# Patient Record
Sex: MALE | Race: WHITE | NOT HISPANIC OR LATINO | Employment: FULL TIME | URBAN - METROPOLITAN AREA
[De-identification: names, ages, dates, MRNs, and addresses within clinical notes are randomized per-mention and may not be internally consistent; named-entity substitution may affect disease eponyms.]

---

## 2024-03-20 ENCOUNTER — OFFICE VISIT (OUTPATIENT)
Dept: NEUROSURGERY | Facility: CLINIC | Age: 59
End: 2024-03-20
Payer: COMMERCIAL

## 2024-03-20 VITALS — HEIGHT: 68 IN | BODY MASS INDEX: 28.43 KG/M2 | WEIGHT: 187.6 LBS | RESPIRATION RATE: 17 BRPM | TEMPERATURE: 98.4 F

## 2024-03-20 DIAGNOSIS — M50.30 DDD (DEGENERATIVE DISC DISEASE), CERVICAL: ICD-10-CM

## 2024-03-20 DIAGNOSIS — M47.22 CERVICAL SPONDYLOSIS WITH RADICULOPATHY: Primary | ICD-10-CM

## 2024-03-20 PROCEDURE — 99203 OFFICE O/P NEW LOW 30 MIN: CPT | Performed by: NEUROLOGICAL SURGERY

## 2024-03-20 RX ORDER — OMEPRAZOLE 40 MG/1
40 CAPSULE, DELAYED RELEASE ORAL DAILY
COMMUNITY

## 2024-03-20 RX ORDER — NEBIVOLOL HYDROCHLORIDE 5 MG/1
5 TABLET ORAL
COMMUNITY

## 2024-03-20 RX ORDER — ATORVASTATIN CALCIUM 20 MG/1
20 TABLET, FILM COATED ORAL DAILY
COMMUNITY

## 2024-03-20 RX ORDER — ZOLPIDEM TARTRATE 5 MG/1
5 TABLET ORAL
COMMUNITY

## 2024-03-20 RX ORDER — CLOPIDOGREL 300 MG/1
300 TABLET, FILM COATED ORAL
COMMUNITY
Start: 2023-11-30

## 2024-03-20 RX ORDER — ASPIRIN 81 MG/1
81 TABLET ORAL
COMMUNITY
Start: 2023-11-26

## 2024-03-20 NOTE — PROGRESS NOTES
Patient: Santi Randhawa  : 1965    Primary Care Provider: Iron Bonilla MD    Requesting Provider: As above        History    Chief Complaint: Neck and left arm pain with weakness.    History of Present Illness: Mr. Randhawa is a 58-year-old bassett who is known to my service.  I previously cared for his son as well.  He has had a several year history of neck difficulty.  Couple of years ago he did some PT with dry needling and the neck pain is low-grade at this point.  However he is having level using his left hand.  Dexterity is diminished in his left hand.  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  He has some ongoing neck pain involving the left shoulder blade.  He has no significant right upper extremity symptoms.    Review of Systems   Constitutional:  Negative for activity change, appetite change, chills, diaphoresis, fatigue, fever and unexpected weight change.   HENT:  Negative for congestion, dental problem, drooling, ear discharge, ear pain, facial swelling, hearing loss, mouth sores, nosebleeds, postnasal drip, rhinorrhea, sinus pressure, sneezing, sore throat, tinnitus, trouble swallowing and voice change.    Eyes:  Negative for photophobia, pain, discharge, redness, itching and visual disturbance.   Respiratory:  Negative for apnea, cough, choking, chest tightness, shortness of breath, wheezing and stridor.    Cardiovascular:  Negative for chest pain, palpitations and leg swelling.   Gastrointestinal:  Negative for abdominal distention, abdominal pain, anal bleeding, blood in stool, constipation, diarrhea, nausea, rectal pain and vomiting.   Endocrine: Negative for cold intolerance, heat intolerance, polydipsia, polyphagia and polyuria.   Genitourinary:  Negative for decreased urine volume, difficulty urinating, dysuria, enuresis, flank pain, frequency, genital sores, hematuria and urgency.   Musculoskeletal:  Positive for neck pain. Negative for arthralgias, back  "pain, gait problem, joint swelling, myalgias and neck stiffness.   Skin:  Negative for color change, pallor, rash and wound.   Allergic/Immunologic: Negative for environmental allergies, food allergies and immunocompromised state.   Neurological:  Negative for dizziness, tremors, seizures, syncope, facial asymmetry, speech difficulty, weakness, light-headedness, numbness and headaches.   Hematological:  Negative for adenopathy. Does not bruise/bleed easily.   Psychiatric/Behavioral:  Negative for agitation, behavioral problems, confusion, decreased concentration, dysphoric mood, hallucinations, self-injury, sleep disturbance and suicidal ideas. The patient is not nervous/anxious and is not hyperactive.    All other systems reviewed and are negative.      The patient's past medical history, past surgical history, family history, and social history have been reviewed at length in the electronic medical record.      Physical Exam:   Temp 98.4 °F (36.9 °C) (Infrared)   Resp 17   Ht 172.7 cm (68\")   Wt 85.1 kg (187 lb 9.6 oz)   BMI 28.52 kg/m²   CONSTITUTIONAL: Patient is well-nourished, pleasant and appears stated age.  MUSCULOSKELETAL:  Neck tenderness to palpation is not observed.   ROM in the neck is normal.  Ranging the left shoulder induces some shoulder pain.  NEUROLOGICAL:  Orientation, memory, attention span, language function, and cognition have been examined and are intact.  Strength is intact in the upper and lower extremities to direct testing.  Muscle tone is normal throughout.  Station and gait are normal.  Sensation is intact to light touch testing throughout.  Deep tendon reflexes are 1+ and symmetrical.  Elías's Sign is negative bilaterally. No clonus is elicited.  Coordination is intact.      Medical Decision Making    Data Review:   (All imaging studies were personally reviewed unless stated otherwise)  MRI of the cervical spine dated 3/7/2024 demonstrates some mild loss of the normal lordosis " with diffuse spondylosis.  There are some broad-based disc-osteophyte at multiple levels.  This is most pronounced on the left at C6-7.  There is no signal change within the cord.    Diagnosis:   1.  Cervical spondylosis with radiculopathy.  2.  Left shoulder dysfunction.    Treatment Options:   I am going to refer the patient for electrodiagnostic studies of the left upper extremity.  Further recommendations will then ensue.      Scribed for Chetan Martínez MD by Cherry Roth CMA on 3/20/2024 13:55 EDT       I, Dr. Martínez, personally performed the services described in the documentation, as scribed in my presence, and it is both accurate and complete.

## 2024-04-10 ENCOUNTER — OFFICE VISIT (OUTPATIENT)
Dept: NEUROSURGERY | Facility: CLINIC | Age: 59
End: 2024-04-10
Payer: COMMERCIAL

## 2024-04-10 VITALS — WEIGHT: 187.2 LBS | TEMPERATURE: 97.3 F | BODY MASS INDEX: 28.37 KG/M2 | HEIGHT: 68 IN

## 2024-04-10 DIAGNOSIS — M50.30 DDD (DEGENERATIVE DISC DISEASE), CERVICAL: ICD-10-CM

## 2024-04-10 DIAGNOSIS — M47.22 CERVICAL SPONDYLOSIS WITH RADICULOPATHY: Primary | ICD-10-CM

## 2024-04-10 PROCEDURE — 99213 OFFICE O/P EST LOW 20 MIN: CPT | Performed by: NEUROLOGICAL SURGERY

## 2024-04-10 NOTE — PROGRESS NOTES
"Patient: Santi Randhawa  : 1965    Primary Care Provider: Iron Bonilla MD    Requesting Provider: As above        History    Chief Complaint: Neck and left arm pain with weakness.    History of Present Illness: Mr. Randhawa is a 58-year-old bassett who is known to my service. I previously cared for his son as well. He has had a several year history of neck difficulty. Couple of years ago he did some PT with dry needling and the neck pain is low-grade at this point. However he is having level using his left hand. Dexterity is diminished in his left hand. 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 He has some ongoing neck pain involving the left shoulder blade.  On occasion he will have a sharp pain extending down the left arm.  He describes shoulder shoulder discomfort bilaterally as well.  He has no significant right upper extremity symptoms.  He sees a massage therapist and physical therapist and that is very helpful for at least a short period of time.  In November he had a heart attack and had a \"-maker\" lesion addressed.  His symptoms are overall tolerable.  He has been more worried about what is potentially ongoing.    Review of Systems   Constitutional:  Negative for activity change, appetite change, chills, diaphoresis, fatigue, fever and unexpected weight change.   HENT:  Negative for congestion, dental problem, drooling, ear discharge, ear pain, facial swelling, hearing loss, mouth sores, nosebleeds, postnasal drip, rhinorrhea, sinus pressure, sinus pain, sneezing, sore throat, tinnitus, trouble swallowing and voice change.    Eyes:  Negative for photophobia, pain, discharge, redness, itching and visual disturbance.   Respiratory:  Negative for apnea, cough, choking, chest tightness, shortness of breath, wheezing and stridor.    Cardiovascular:  Negative for chest pain, palpitations and leg swelling.   Gastrointestinal:  Negative for abdominal distention, abdominal " "pain, anal bleeding, blood in stool, constipation, diarrhea, nausea, rectal pain and vomiting.   Endocrine: Negative for cold intolerance, heat intolerance, polydipsia, polyphagia and polyuria.   Genitourinary:  Negative for decreased urine volume, difficulty urinating, dysuria, enuresis, flank pain, frequency, genital sores, hematuria, penile discharge, penile pain, penile swelling, scrotal swelling, testicular pain and urgency.   Musculoskeletal:  Positive for neck pain and neck stiffness. Negative for arthralgias, back pain, gait problem, joint swelling and myalgias.   Skin:  Negative for color change, pallor, rash and wound.   Allergic/Immunologic: Negative for environmental allergies, food allergies and immunocompromised state.   Neurological:  Positive for numbness. Negative for dizziness, tremors, seizures, syncope, facial asymmetry, speech difficulty, weakness, light-headedness and headaches.   Hematological:  Negative for adenopathy. Does not bruise/bleed easily.   Psychiatric/Behavioral:  Negative for agitation, behavioral problems, confusion, decreased concentration, dysphoric mood, hallucinations, self-injury, sleep disturbance and suicidal ideas. The patient is not nervous/anxious and is not hyperactive.      The patient's past medical history, past surgical history, family history, and social history have been reviewed at length in the electronic medical record.      Physical Exam:   Temp 97.3 °F (36.3 °C) (Infrared)   Ht 172.7 cm (67.99\")   Wt 84.9 kg (187 lb 3.2 oz)   BMI 28.47 kg/m²   Strength and tone are normal in his upper extremities.    Medical Decision Making    Data Review:   (All imaging studies were personally reviewed unless stated otherwise)  MRI of the cervical spine dated 3/7/2024 demonstrates some mild loss of the normal lordosis with diffuse spondylosis. There are some broad-based disc-osteophyte at multiple levels. This is most pronounced on the left at C6-7. There is no signal " change within the cord.     Electrodiagnostic studies performed by Dr. Prasad demonstrated a mild bilateral carpal tunnel syndrome.  There is no evidence of a cervical radiculopathy on the left.    Diagnosis:   1.  Cervical spondylosis with mild radiculopathy.  2.  Mild bilateral carpal tunnel syndrome.    Treatment Options:   I have reassured the patient.  For now he is getting by.  If symptoms progress then I will be happy to reevaluate him.      Scribed for Chetan Martínez MD by Cierra Stark MA on 4/10/2024 09:27 EDT      I, Dr. Martínez, personally performed the services described in the documentation, as scribed in my presence, and it is both accurate and complete.